# Patient Record
Sex: FEMALE | Race: OTHER
[De-identification: names, ages, dates, MRNs, and addresses within clinical notes are randomized per-mention and may not be internally consistent; named-entity substitution may affect disease eponyms.]

---

## 2020-08-20 ENCOUNTER — HOSPITAL ENCOUNTER (EMERGENCY)
Dept: HOSPITAL 12 - ER | Age: 35
Discharge: HOME | End: 2020-08-20
Payer: MEDICAID

## 2020-08-20 VITALS — WEIGHT: 143 LBS | BODY MASS INDEX: 28.07 KG/M2 | HEIGHT: 60 IN

## 2020-08-20 DIAGNOSIS — S61.411A: Primary | ICD-10-CM
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
## 2020-08-20 PROCEDURE — A4663 DIALYSIS BLOOD PRESSURE CUFF: HCPCS

## 2020-08-20 NOTE — NUR
Laceration on hand was cleansed and irrigated.  Dermabond applied by MD.  
Vaccine administered (with consent).

DC AND FOLLOW UP INSTRUCTIONS GIVEN AND EXPLAINED TO PATIENT WHO STATES HE 
UNDERSTANDS ALL INSTRUCTIONS